# Patient Record
Sex: FEMALE | Race: WHITE | NOT HISPANIC OR LATINO | URBAN - METROPOLITAN AREA
[De-identification: names, ages, dates, MRNs, and addresses within clinical notes are randomized per-mention and may not be internally consistent; named-entity substitution may affect disease eponyms.]

---

## 2018-08-28 ENCOUNTER — OFFICE VISIT (OUTPATIENT)
Dept: URBAN - METROPOLITAN AREA CLINIC 16 | Facility: CLINIC | Age: 78
End: 2018-08-28
Payer: COMMERCIAL

## 2018-08-28 PROCEDURE — 92014 COMPRE OPH EXAM EST PT 1/>: CPT | Performed by: OPTOMETRIST

## 2018-08-28 ASSESSMENT — INTRAOCULAR PRESSURE
OD: 15
OS: 15

## 2018-08-28 NOTE — IMPRESSION/PLAN
Impression: Dermatochalasis of right upper eyelid: H02.831.  Plan: RTC for VF and consult with Dr Cornel Marshall if desires

## 2019-09-05 ENCOUNTER — OFFICE VISIT (OUTPATIENT)
Dept: URBAN - METROPOLITAN AREA CLINIC 16 | Facility: CLINIC | Age: 79
End: 2019-09-05
Payer: COMMERCIAL

## 2019-09-05 DIAGNOSIS — Z96.1 PRESENCE OF INTRAOCULAR LENS: Primary | ICD-10-CM

## 2019-09-05 PROCEDURE — 92014 COMPRE OPH EXAM EST PT 1/>: CPT | Performed by: OPTOMETRIST

## 2019-09-05 ASSESSMENT — INTRAOCULAR PRESSURE
OS: 12
OD: 11

## 2019-09-05 NOTE — IMPRESSION/PLAN
Impression: Dermatochalasis of right upper eyelid: H02.831.  Plan: RTC for VF and then consult with Dr Magi Pandey

## 2019-10-07 ENCOUNTER — TESTING ONLY (OUTPATIENT)
Dept: URBAN - METROPOLITAN AREA CLINIC 16 | Facility: CLINIC | Age: 79
End: 2019-10-07
Payer: COMMERCIAL

## 2019-10-07 PROCEDURE — 92081 LIMITED VISUAL FIELD XM: CPT | Performed by: OPHTHALMOLOGY

## 2019-10-29 ENCOUNTER — OFFICE VISIT (OUTPATIENT)
Dept: URBAN - METROPOLITAN AREA CLINIC 23 | Facility: CLINIC | Age: 79
End: 2019-10-29
Payer: COMMERCIAL

## 2019-10-29 DIAGNOSIS — H02.423 MYOGENIC PTOSIS OF BILATERAL EYELIDS: ICD-10-CM

## 2019-10-29 DIAGNOSIS — H02.834 DERMATOCHALASIS OF LEFT UPPER EYELID: ICD-10-CM

## 2019-10-29 DIAGNOSIS — H02.831 DERMATOCHALASIS OF RIGHT UPPER EYELID: Primary | ICD-10-CM

## 2019-10-29 PROCEDURE — 92285 EXTERNAL OCULAR PHOTOGRAPHY: CPT | Performed by: OPHTHALMOLOGY

## 2019-10-29 PROCEDURE — 99214 OFFICE O/P EST MOD 30 MIN: CPT | Performed by: OPHTHALMOLOGY

## 2019-10-29 PROCEDURE — 99204 OFFICE O/P NEW MOD 45 MIN: CPT | Performed by: OPHTHALMOLOGY

## 2019-10-29 RX ORDER — CEPHALEXIN 500 MG/1
500 MG CAPSULE ORAL
Qty: 15 | Refills: 0 | Status: INACTIVE
Start: 2019-10-29 | End: 2020-01-27

## 2019-10-29 RX ORDER — ERYTHROMYCIN 5 MG/G
OINTMENT OPHTHALMIC
Qty: 2 | Refills: 0 | Status: INACTIVE
Start: 2019-10-29 | End: 2020-05-12

## 2019-10-29 NOTE — IMPRESSION/PLAN
Impression: Dermatochalasis of right upper eyelid: H02.831. Photo Interpretation: supports clinical findings and dx documented in chart. Plan: Discussed diagnosis in detail with patient. Discussed treatment options with patient. Surgical treatment is required. Surgical risks and benefits were discussed, explained and understood by patient. Patient elects to have surgery. RL2. HVF 36 superior taped and untaped ordered today,  visually significant recommend Bilateral Upper Eyelid Blepharoplasty. Photos taken today.

## 2019-10-29 NOTE — IMPRESSION/PLAN
Impression: Myogenic ptosis of bilateral eyelids: H02.423.  Plan: if symptomatic after BULB will evaluate

## 2020-01-15 ENCOUNTER — SURGERY (OUTPATIENT)
Dept: URBAN - METROPOLITAN AREA SURGERY 11 | Facility: SURGERY | Age: 80
End: 2020-01-15
Payer: COMMERCIAL

## 2020-01-15 RX ORDER — TRAMADOL HYDROCHLORIDE 50 MG/1
50 MG TABLET, FILM COATED ORAL
Qty: 20 | Refills: 0 | Status: INACTIVE
Start: 2020-01-15 | End: 2020-01-19

## 2020-01-17 ENCOUNTER — POST-OPERATIVE VISIT (OUTPATIENT)
Dept: URBAN - METROPOLITAN AREA CLINIC 23 | Facility: CLINIC | Age: 80
End: 2020-01-17

## 2020-01-17 PROCEDURE — 99024 POSTOP FOLLOW-UP VISIT: CPT | Performed by: OPTOMETRIST

## 2020-01-23 ENCOUNTER — POST-OPERATIVE VISIT (OUTPATIENT)
Dept: URBAN - METROPOLITAN AREA CLINIC 23 | Facility: CLINIC | Age: 80
End: 2020-01-23

## 2020-01-23 ASSESSMENT — INTRAOCULAR PRESSURE
OD: 13
OS: 13

## 2020-01-27 ENCOUNTER — POST-OPERATIVE VISIT (OUTPATIENT)
Dept: URBAN - METROPOLITAN AREA CLINIC 23 | Facility: CLINIC | Age: 80
End: 2020-01-27

## 2020-01-27 PROCEDURE — 99024 POSTOP FOLLOW-UP VISIT: CPT | Performed by: OPHTHALMOLOGY

## 2020-02-18 ENCOUNTER — POST-OPERATIVE VISIT (OUTPATIENT)
Dept: URBAN - METROPOLITAN AREA CLINIC 23 | Facility: CLINIC | Age: 80
End: 2020-02-18

## 2020-02-18 DIAGNOSIS — Z09 ENCNTR FOR F/U EXAM AFT TRTMT FOR COND OTH THAN MALIG NEOPLM: Primary | ICD-10-CM

## 2020-05-12 ENCOUNTER — OFFICE VISIT (OUTPATIENT)
Dept: URBAN - METROPOLITAN AREA CLINIC 23 | Facility: CLINIC | Age: 80
End: 2020-05-12
Payer: COMMERCIAL

## 2020-05-12 DIAGNOSIS — L91.0 HYPERTROPHIC SCAR: ICD-10-CM

## 2020-05-12 PROCEDURE — 99214 OFFICE O/P EST MOD 30 MIN: CPT | Performed by: OPHTHALMOLOGY

## 2020-05-12 ASSESSMENT — INTRAOCULAR PRESSURE
OD: 13
OS: 11

## 2020-05-12 NOTE — IMPRESSION/PLAN
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 Courtney Lui in 3 months. Discussed diagnosis in detail with patient. Discussed treatment plan with patient. Advised sunglasses and sunscreen. *Patient is currently being treated during COVID-19 epidemic, which limits our availability to establish proper follow up care. Patient understands these limitations, and is aware that we will continue treatment and follow up based on our availability, as determined by local state and federal guidelines.

## 2020-09-10 ENCOUNTER — OFFICE VISIT (OUTPATIENT)
Dept: URBAN - METROPOLITAN AREA CLINIC 16 | Facility: CLINIC | Age: 80
End: 2020-09-10
Payer: COMMERCIAL

## 2020-09-10 PROCEDURE — 92014 COMPRE OPH EXAM EST PT 1/>: CPT | Performed by: OPTOMETRIST

## 2020-09-10 ASSESSMENT — INTRAOCULAR PRESSURE
OD: 16
OS: 17

## 2021-10-04 ENCOUNTER — OFFICE VISIT (OUTPATIENT)
Dept: URBAN - METROPOLITAN AREA CLINIC 16 | Facility: CLINIC | Age: 81
End: 2021-10-04
Payer: COMMERCIAL

## 2021-10-04 DIAGNOSIS — H16.223 BILATERAL KERATOCONJUNCTIVITIS SICCA, NOT SPECIFIED AS SJOGREN'S: ICD-10-CM

## 2021-10-04 PROCEDURE — 99214 OFFICE O/P EST MOD 30 MIN: CPT | Performed by: OPTOMETRIST

## 2021-10-04 RX ORDER — CYCLOSPORINE 0.5 MG/ML
0.05 % EMULSION OPHTHALMIC
Qty: 60 | Refills: 6 | Status: ACTIVE
Start: 2021-10-04

## 2021-10-04 ASSESSMENT — INTRAOCULAR PRESSURE
OS: 14
OD: 14

## 2021-10-04 ASSESSMENT — KERATOMETRY
OD: 47.00
OS: 46.38

## 2021-10-04 NOTE — IMPRESSION/PLAN
Impression: Bilateral keratoconjunctivitis sicca, not specified as Sjogren's: D82.463. Plan: Discussed diagnosis in detail with patient. Patient instructed to use artificial tears as needed. New medication(s) Rx given today.

## 2022-10-03 ENCOUNTER — OFFICE VISIT (OUTPATIENT)
Dept: URBAN - METROPOLITAN AREA CLINIC 16 | Facility: CLINIC | Age: 82
End: 2022-10-03
Payer: MEDICARE

## 2022-10-03 DIAGNOSIS — H16.223 BILATERAL KERATOCONJUNCTIVITIS SICCA, NOT SPECIFIED AS SJOGREN'S: Primary | ICD-10-CM

## 2022-10-03 DIAGNOSIS — Z96.1 PRESENCE OF PSEUDOPHAKIA: ICD-10-CM

## 2022-10-03 PROCEDURE — 99213 OFFICE O/P EST LOW 20 MIN: CPT | Performed by: OPTOMETRIST

## 2022-10-03 RX ORDER — CYCLOSPORINE 0.5 MG/ML
0.05 % EMULSION OPHTHALMIC
Qty: 60 | Refills: 6 | Status: ACTIVE
Start: 2022-10-03

## 2022-10-03 ASSESSMENT — INTRAOCULAR PRESSURE
OS: 14
OD: 14

## 2022-10-03 ASSESSMENT — KERATOMETRY
OS: 46.38
OD: 47.00

## 2022-10-03 NOTE — IMPRESSION/PLAN
Impression: Bilateral keratoconjunctivitis sicca, not specified as Sjogren's: Q78.255. Plan: Discussed diagnosis in detail with patient. Will continue to observe condition and or symptoms. Medication refill given today. Patient instructed to use artificial tears as needed.